# Patient Record
Sex: MALE | Race: WHITE | NOT HISPANIC OR LATINO | URBAN - METROPOLITAN AREA
[De-identification: names, ages, dates, MRNs, and addresses within clinical notes are randomized per-mention and may not be internally consistent; named-entity substitution may affect disease eponyms.]

---

## 2019-04-23 ENCOUNTER — OFFICE VISIT - HEALTHEAST (OUTPATIENT)
Dept: FAMILY MEDICINE | Facility: CLINIC | Age: 24
End: 2019-04-23

## 2019-04-23 DIAGNOSIS — Z00.00 ANNUAL PHYSICAL EXAM: ICD-10-CM

## 2019-04-23 DIAGNOSIS — R03.0 ELEVATED BLOOD PRESSURE READING WITHOUT DIAGNOSIS OF HYPERTENSION: ICD-10-CM

## 2019-04-23 DIAGNOSIS — E66.9 OBESITY (BMI 30-39.9): ICD-10-CM

## 2019-04-23 DIAGNOSIS — G47.33 OSA (OBSTRUCTIVE SLEEP APNEA): ICD-10-CM

## 2019-04-23 DIAGNOSIS — R10.84 ABDOMINAL PAIN, GENERALIZED: ICD-10-CM

## 2019-04-23 LAB
ALBUMIN SERPL-MCNC: 4.3 G/DL (ref 3.5–5)
ALP SERPL-CCNC: 71 U/L (ref 45–120)
ALT SERPL W P-5'-P-CCNC: 42 U/L (ref 0–45)
ANION GAP SERPL CALCULATED.3IONS-SCNC: 9 MMOL/L (ref 5–18)
AST SERPL W P-5'-P-CCNC: 17 U/L (ref 0–40)
BILIRUB SERPL-MCNC: 0.4 MG/DL (ref 0–1)
BUN SERPL-MCNC: 16 MG/DL (ref 8–22)
CALCIUM SERPL-MCNC: 10 MG/DL (ref 8.5–10.5)
CHLORIDE BLD-SCNC: 106 MMOL/L (ref 98–107)
CHOLEST SERPL-MCNC: 166 MG/DL
CO2 SERPL-SCNC: 25 MMOL/L (ref 22–31)
CREAT SERPL-MCNC: 0.78 MG/DL (ref 0.7–1.3)
FASTING STATUS PATIENT QL REPORTED: YES
GFR SERPL CREATININE-BSD FRML MDRD: >60 ML/MIN/1.73M2
GLUCOSE BLD-MCNC: 87 MG/DL (ref 70–125)
HBA1C MFR BLD: 5 % (ref 3.5–6)
HDLC SERPL-MCNC: 37 MG/DL
LDLC SERPL CALC-MCNC: 82 MG/DL
POTASSIUM BLD-SCNC: 4.2 MMOL/L (ref 3.5–5)
PROT SERPL-MCNC: 7.1 G/DL (ref 6–8)
SODIUM SERPL-SCNC: 140 MMOL/L (ref 136–145)
TRIGL SERPL-MCNC: 234 MG/DL

## 2019-04-23 ASSESSMENT — MIFFLIN-ST. JEOR: SCORE: 2169.67

## 2019-04-24 ENCOUNTER — COMMUNICATION - HEALTHEAST (OUTPATIENT)
Dept: FAMILY MEDICINE | Facility: CLINIC | Age: 24
End: 2019-04-24

## 2019-04-24 LAB — MILK IGE QN: <0.35 KU/L

## 2019-04-25 ENCOUNTER — COMMUNICATION - HEALTHEAST (OUTPATIENT)
Dept: FAMILY MEDICINE | Facility: CLINIC | Age: 24
End: 2019-04-25

## 2019-04-25 LAB
GLIADIN IGA SER-ACNC: 1.4 U/ML
GLIADIN IGG SER-ACNC: <0.4 U/ML
IGA SERPL-MCNC: 209 MG/DL (ref 65–400)
TTG IGA SER-ACNC: 0.3 U/ML
TTG IGG SER-ACNC: <0.6 U/ML

## 2019-07-10 ENCOUNTER — OFFICE VISIT - HEALTHEAST (OUTPATIENT)
Dept: SLEEP MEDICINE | Facility: CLINIC | Age: 24
End: 2019-07-10

## 2019-07-10 ENCOUNTER — AMBULATORY - HEALTHEAST (OUTPATIENT)
Dept: SLEEP MEDICINE | Facility: CLINIC | Age: 24
End: 2019-07-10

## 2019-07-10 DIAGNOSIS — R29.818 SUSPECTED SLEEP APNEA: ICD-10-CM

## 2019-07-10 DIAGNOSIS — G47.10 HYPERSOMNIA: ICD-10-CM

## 2019-07-10 DIAGNOSIS — R06.83 SNORING: ICD-10-CM

## 2019-07-10 ASSESSMENT — MIFFLIN-ST. JEOR: SCORE: 2142.45

## 2019-07-12 ENCOUNTER — OFFICE VISIT - HEALTHEAST (OUTPATIENT)
Dept: FAMILY MEDICINE | Facility: CLINIC | Age: 24
End: 2019-07-12

## 2019-07-12 DIAGNOSIS — K52.9 CHRONIC DIARRHEA: ICD-10-CM

## 2019-07-12 DIAGNOSIS — G43.A0 CYCLIC VOMITING SYNDROME, INTRACTABILITY OF VOMITING NOT SPECIFIED, PRESENCE OF NAUSEA NOT SPECIFIED: ICD-10-CM

## 2019-07-12 ASSESSMENT — MIFFLIN-ST. JEOR: SCORE: 2140.64

## 2019-07-17 ENCOUNTER — COMMUNICATION - HEALTHEAST (OUTPATIENT)
Dept: SLEEP MEDICINE | Facility: CLINIC | Age: 24
End: 2019-07-17

## 2019-07-17 DIAGNOSIS — G47.33 OBSTRUCTIVE SLEEP APNEA: ICD-10-CM

## 2019-08-12 ENCOUNTER — RECORDS - HEALTHEAST (OUTPATIENT)
Dept: ADMINISTRATIVE | Facility: OTHER | Age: 24
End: 2019-08-12

## 2019-08-14 ENCOUNTER — COMMUNICATION - HEALTHEAST (OUTPATIENT)
Dept: SLEEP MEDICINE | Facility: CLINIC | Age: 24
End: 2019-08-14

## 2019-08-14 ENCOUNTER — RECORDS - HEALTHEAST (OUTPATIENT)
Dept: ADMINISTRATIVE | Facility: OTHER | Age: 24
End: 2019-08-14

## 2019-08-26 ENCOUNTER — AMBULATORY - HEALTHEAST (OUTPATIENT)
Dept: SLEEP MEDICINE | Facility: CLINIC | Age: 24
End: 2019-08-26

## 2019-08-27 ENCOUNTER — COMMUNICATION - HEALTHEAST (OUTPATIENT)
Dept: SLEEP MEDICINE | Facility: CLINIC | Age: 24
End: 2019-08-27

## 2019-10-12 ENCOUNTER — COMMUNICATION - HEALTHEAST (OUTPATIENT)
Dept: SLEEP MEDICINE | Facility: CLINIC | Age: 24
End: 2019-10-12

## 2019-12-22 ENCOUNTER — COMMUNICATION - HEALTHEAST (OUTPATIENT)
Dept: SLEEP MEDICINE | Facility: CLINIC | Age: 24
End: 2019-12-22

## 2019-12-27 ENCOUNTER — COMMUNICATION - HEALTHEAST (OUTPATIENT)
Dept: SLEEP MEDICINE | Facility: CLINIC | Age: 24
End: 2019-12-27

## 2021-02-01 ENCOUNTER — COMMUNICATION - HEALTHEAST (OUTPATIENT)
Dept: FAMILY MEDICINE | Facility: CLINIC | Age: 26
End: 2021-02-01

## 2021-02-01 ENCOUNTER — COMMUNICATION - HEALTHEAST (OUTPATIENT)
Dept: SLEEP MEDICINE | Facility: CLINIC | Age: 26
End: 2021-02-01

## 2021-05-28 NOTE — TELEPHONE ENCOUNTER
----- Message from Case Duran MD sent at 4/25/2019  7:35 AM CDT -----  Is inform patient that the milk allergy testing was negative.  Mail letter please  Case Duran MD  4/25/2019

## 2021-05-28 NOTE — TELEPHONE ENCOUNTER
Left message to call back for: REsults- letter sent  Information to relay to patient:  MD's message below- letter sent

## 2021-05-28 NOTE — PROGRESS NOTES
Assessment:     1. Annual physical exam  Comprehensive Metabolic Panel    Lipid Cascade    Celiac(Gluten)Antibody Panel    Milk IgE   2. Abdominal pain, generalized     3. SUNG (obstructive sleep apnea)  Ambulatory referral to Sleep Medicine   4. Obesity (BMI 30-39.9)  Glycosylated Hemoglobin A1c     Continue to monitor abdominal pain.  Discussed elimination diet.  He does admit that when he takes ice cream smoothies, he pretty much has to run to the bathroom right away.  I believe this is milk intolerance versus lactose intolerance.  He is able to tolerate a bowl of cereal with milk without too much of side effect.  He is also thinking perhaps an allergy and will go ahead and check the labs.  Did discuss that antibodies may not correlate with clinical symptoms if the system was not challenged.  He agrees.  Noted elevated blood pressure that somewhat came down during the second recheck.  Encouraged that he check it outside of clinic and come back in 3-6 months time.    We discussed his weight.  Discussed dietary diary and weight loss with lifestyle modification.  He is agreeable.  Discussed that with the weight loss, his blood pressure may come in the normal range.    Screening labs as above.     Plan:       All questions answered.  Diagnosis explained in detail, including differential.  Dietary diary.  Discussed healthy lifestyle modifications.  Educational material distributed.  Follow up: Return in about 3 months (around 7/23/2019) for Recheck.     Subjective:     Chief Complaint   Patient presents with     Annual Exam     Sleep apnea- very loud while sleeping- has had sleep apnea test before about 2-3 years ago,      Abdominal Pain     Seemed to always be an issues his entire life- used to throw up bio in the morning, relating this maybe to gluten? has seen other doctors about this        Edward BETHANY Prado is a 23 y.o. male who presents for an annual exam. The patient reports that there is not domestic violence in  his life.     1- SUNG: Patient informs of snoring issues and had a sleep study done in [place] about 2 years ago.  He was told that he is in between the need for a CPAP and he opted for doing lifestyle modifications.  Now, he feels that he should start getting a CPAP machine as his symptoms have persisted.    I reviewed his past sleep study done and in [place] that is available in care everywhere.  It was recommended that he use a CPAP or use a dental appliance.  I will refer him to a sleep medicine to further evaluate into this.    2- Abdominal pain: In epigastric pain, bilious vomiting in morning for the last few months. This has stopped after starting Nexium. Now has started having Diarrhea, now stools are 5-6 x a day. Feels like high load of milk. Possible Gluten allergy.         PERTINENT FINDINGS ON PHYSICAL EXAMINATION: BMI 28, neck circumference 17.25 inches. Oropharynx reveals a class IV Mallampati, 3+ tonsillar tissue.     STUDY RESULTS: Patient was studied via overnight polysomnography. I personally reviewed the raw data in detail. Following lights out, sleep onset occurred after 16 minutes. Total sleep time (TST) was 347 minutes corresponding to a sleep efficiency of 78%. REM latency was prolonged at 177 minutes. Sleep architecture revealed normal cycling between non-REM and REM sleep with 3 REM periods. Stage 1 sleep at 8.5% of TST, stage 2 sleep occupied 66.3% of TST, stage 3 sleep 12.1% of TST, and REM sleep occupied 13.1% of TST. Periodic limb movements were not excessively noted. EEG morphology was normal.     RESPIRATORY MEASUREMENTS: Mean SpO2 97%. SpO2 rehana 89%. According to standard scoring criteria, patient had 58 hypopneas 2 obstructive apneas corresponding to an index (AHI) 15 per hour. AHI in supine sleep was 57 per hour.     CLINICAL OBSERVATION: Moderate intermittent snoring was described. Preferred mask during demo was a large Wisp.     ELECTROCARDIOGRAPHIC OBSERVATION: Sinus tachycardia.  No other arrhythmias. Mean heart rate 77 bpm, max heart rate 101 bpm.     CONCLUSIONS:  1. Obstructive sleep apnea diagnosed by stress (AHI 15/hour, SpO2 rehana 89%).   2. History of insomnia and ADHD.     RECOMMENDATIONS:  1. Recommend treatment of sleep apnea via CPAP or dental appliance.  2. If the patient would like to start CPAP, he may consider CPAP titration study to determine response to therapy and optimal pressure or auto-titrating CPAP (5 to 10 cm H2O).   3. Patient to follow up with Sleep Medicine Service.     Jackie Beauchamp MD  [facility]  Sleep Medicine    cc:    D: 04/28/2016 13:08:59/KAB Job ID: [id number]      Healthy Habits:   Regular Exercise: Yes and No  Sunscreen Use: Yes  Healthy Diet: Yes and No  Dental Visits Regularly: Yes  Seat Belt: Yes  Sexually active: Yes  Monthly Self Testicular Exams:  Yes  Hemoccults: No  Flex Sig: No  Colonoscopy: No  Lipid Profile: No  Glucose Screen: No  Prevention of Osteoporosis: N/A  Last Dexa: N/A  Guns at Home:  Yes  Guns Safety Locks:  Yes      Immunization History   Administered Date(s) Administered     DTP / HiB 1993, 02/15/1994, 04/19/1994, 04/26/1995     Dtap 04/26/1995, 02/17/1999     HPV 9 Valent 10/31/2011, 01/06/2012, 05/21/2012     HPV Quadrivalent 10/31/2011, 01/06/2012, 05/21/2012     Hep A, Adult IM (19yr & older) 07/18/2012     Hep A, historic 07/18/2012     Hep B, historic 1993, 1993, 04/19/1994     History of Varicella/chicken Pox 11/16/1999     Influenza [id number], 12/28/2009     Influenza, Seasonal, Inj PF IIV3 12/28/2009     Influenza,seasonal, Inj IIV3 12/28/2009, 11/11/2010, 10/06/2011, 10/05/2013     MMR 01/17/1995, 02/17/1999     Meningococcal MCV4O 07/20/2005, 04/30/2012     Meningococcal MCV4P 07/20/2005, 04/30/2012     OPV,Trivalent,Historic(5546-6379 only) 1993, 02/15/1994, 04/26/1995, 02/17/1999     PPD Test 07/12/1994     Tdap 07/05/2006, 12/09/2016     Varicella 12/20/1995      Immunization status: up to date and documented.    No exam data present    No current outpatient medications on file.     No current facility-administered medications for this visit.      History reviewed. No pertinent past medical history.  History reviewed. No pertinent surgical history.  Amoxicillin-pot clavulanate  History reviewed. No pertinent family history.  Social History     Socioeconomic History     Marital status: Single     Spouse name: Not on file     Number of children: Not on file     Years of education: Not on file     Highest education level: Not on file   Occupational History     Not on file   Social Needs     Financial resource strain: Not on file     Food insecurity:     Worry: Not on file     Inability: Not on file     Transportation needs:     Medical: Not on file     Non-medical: Not on file   Tobacco Use     Smoking status: Never Smoker     Smokeless tobacco: Never Used   Substance and Sexual Activity     Alcohol use: Not on file     Drug use: Not on file     Sexual activity: Not on file   Lifestyle     Physical activity:     Days per week: Not on file     Minutes per session: Not on file     Stress: Not on file   Relationships     Social connections:     Talks on phone: Not on file     Gets together: Not on file     Attends Bahai service: Not on file     Active member of club or organization: Not on file     Attends meetings of clubs or organizations: Not on file     Relationship status: Not on file     Intimate partner violence:     Fear of current or ex partner: Not on file     Emotionally abused: Not on file     Physically abused: Not on file     Forced sexual activity: Not on file   Other Topics Concern     Not on file   Social History Narrative     Not on file       Review of Systems  General:  Denies problem  Eyes: Denies problem  Ears/Nose/Throat: Denies problem  Cardiovascular: Denies problem  Respiratory:  Denies problem  Gastrointestinal:  As in HPI  Genitourinary: Denies  "problem  Musculoskeletal:  Denies problem  Skin: Denies problem  Neurologic: Denies problem  Psychiatric: Denies problem  Endocrine: Denies problem  Heme/Lymphatic: Denies problem   Allergic/Immunologic: Denies problem        Objective:     Vitals:    04/23/19 0732   BP: (!) 147/93   Pulse: 99   SpO2: 95%   Weight: (!) 248 lb (112.5 kg)   Height: 6' 2\" (1.88 m)     Body mass index is 31.84 kg/m .    Physical  General Appearance: Alert, cooperative, no distress, appears stated age  Head: Normocephalic, without obvious abnormality, atraumatic  Eyes: PERRL, conjunctiva/corneas clear, EOM's intact  Ears: Normal TM's and external ear canals, both ears  Nose: Nares normal, septum midline,mucosa normal, no drainage  Throat: Lips, mucosa, and tongue normal; teeth and gums normal  Neck: Supple, symmetrical, trachea midline, no adenopathy;  thyroid: not enlarged, symmetric, no tenderness/mass/nodules; no carotid bruit or JVD  Back: Symmetric, no curvature, ROM normal, no CVA tenderness  Lungs: Clear to auscultation bilaterally, respirations unlabored  Heart: Regular rate and rhythm, S1 and S2 normal, no murmur, rub, or gallop,  Abdomen: Soft, non-tender, bowel sounds active all four quadrants,  no masses, no organomegaly  Musculoskeletal: Normal range of motion. No joint swelling or deformity.   Extremities: Extremities normal, atraumatic, no cyanosis or edema  Skin: Skin color, texture, turgor normal, no rashes or lesions  Lymph nodes: Cervical, supraclavicular, and axillary nodes normal  Neurologic: He is alert. He has normal reflexes.   Psychiatric: He has a normal mood and affect.            "

## 2021-05-28 NOTE — TELEPHONE ENCOUNTER
Left message to call back for: Test results  Information to relay to patient:  LM for Pt letting him know Dr Duran's message.  CHRISTIAN CamachoA

## 2021-05-28 NOTE — TELEPHONE ENCOUNTER
Left message to call back for: Results  Information to relay to patient:  LMTCB. Please relay message below from Dr. Duran to patient. Result letter mailed to patient.

## 2021-05-28 NOTE — TELEPHONE ENCOUNTER
Patient Returning Call  Reason for call:  Retuning phone call  Information relayed to patient:  Below message relayed to patient.  Patient has additional questions:  No  If YES, what are your questions/concerns:  No additional questions at this time.  Okay to leave a detailed message?: No call back needed

## 2021-05-28 NOTE — TELEPHONE ENCOUNTER
----- Message from Case Duran MD sent at 4/25/2019  3:17 PM CDT -----  Please inform patient that the testing for gluten is negative.  Case Duran MD  4/25/2019

## 2021-05-28 NOTE — TELEPHONE ENCOUNTER
----- Message from Case uDran MD sent at 4/24/2019  9:43 AM CDT -----  Please inform patient the lab available so far are normal.  His triglycerides are elevated, however he was not fasting.  These numbers can be rechecked when he is truly fasting.  Labs for allergy testing is still pending.  Case Duran MD  4/24/2019

## 2021-05-30 NOTE — PATIENT INSTRUCTIONS - HE
Equipment Instructions    We will process your PAP order and send it to a Durable Medical Equipment (DME) provider.    The medical equipment company should call you within 7 days.  If you have not heard from the company, please contact them to see if they received your order and are planning to call you.    Please call us at [phone] if you are unable to contact the medical equipment company or if they do not have the order.    If you are starting a new PAP machine, please call us after you use it the first night to let us know how it went. This call also helps us know that you received your equipment and that everything is ready. Please use our central phone number [phone]    Contact information for [employer] company:    -[employer] Tel: [phone]    Please bring your machine, mask, hose and power cord on the next clinical visit with me. Thank you.

## 2021-05-30 NOTE — TELEPHONE ENCOUNTER
Called the patient, informed him of below. Patient expressed understanding. Prescription sent.    Order for Durable Medical Equipment was processed and equipment ordered.     DME Company: [employer]    Date: 7/17/2019    Ordering Provider: Dr. Ulloa    Equipment Ordered: CPAP    Fax Number: [facility] [employer] [facility] (in house)    Trisha Montalvo CMA 7/17/2019 2:50 PM

## 2021-05-30 NOTE — PROGRESS NOTES
Dear Case Key Md  [address] [place] [address]    Thank you for the opportunity to participate in the care of Mr. Edward Prado.    He is a 23 y.o. male who comes to the clinic for the transfer of care of his obstructive sleep apnea.  The patient states that he was diagnosed with obstructive sleep apnea from [facility] in [place] approximately 2 years ago.  He was strongly advised to lose weight as the sole method to treat his sleep apnea.  After recently recording himself sleeping he found that he still suffer from pauses in his breathing followed by loud snoring.  He also continues to wake up feeling tired with drowsy driving.  The patient's review of systems is also significant for history of GERD.    The patient's family history did not reveal any sleep disordered breathing.     Ideal Sleep-Wake Cycle(devoid of societal pressure):    Patient would try to initiate sleep at around 2 AM with a sleep latency of 20 minutes. The patient would have 15 awakenings. Final wake up time is around 9 AM.    Past Medical History  No past medical history on file.     Past Surgical History  Past Surgical History:   Procedure Laterality Date     RADIAL HEAD ARTHROPLASTY  2009        [facility]  No current outpatient medications on file.     No current facility-administered medications for this visit.         Allergies  Amoxicillin-pot clavulanate     Social History  Social History     Socioeconomic History     Marital status: Single     Spouse name: Not on file     Number of children: Not on file     Years of education: Not on file     Highest education level: Not on file   Occupational History     Not on file   Social Needs     Financial resource strain: Not on file     Food insecurity:     Worry: Not on file     Inability: Not on file     Transportation needs:     Medical: Not on file     Non-medical: Not on file   Tobacco Use     Smoking status: Never Smoker     Smokeless tobacco: Never Used    Substance and Sexual Activity     Alcohol use: Yes     Frequency: 2-4 times a month     Drinks per session: 1 or 2     Binge frequency: Never     Drug use: Yes     Types: Marijuana     Comment: couple of times a week.     Sexual activity: Not on file   Lifestyle     Physical activity:     Days per week: Not on file     Minutes per session: Not on file     Stress: Not on file   Relationships     Social connections:     Talks on phone: Not on file     Gets together: Not on file     Attends Yarsanism service: Not on file     Active member of club or organization: Not on file     Attends meetings of clubs or organizations: Not on file     Relationship status: Not on file     Intimate partner violence:     Fear of current or ex partner: Not on file     Emotionally abused: Not on file     Physically abused: Not on file     Forced sexual activity: Not on file   Other Topics Concern     Not on file   Social History Narrative    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 No children        Case Duran MD  4/23/2019            Family History  Family History   Problem Relation Age of Onset     Thyroid cancer Mother      Pneumonia Paternal Grandmother      Osteoporosis Paternal Grandmother      Diabetes type II Paternal Grandfather         Review of Systems:  Constitutional: Negative except as noted in HPI.   Eyes: Negative except as noted in HPI.   ENT: Negative except as noted in HPI.   Cardiovascular: Negative except as noted in HPI.   Respiratory: Negative except as noted in HPI.   Gastrointestinal: Negative except as noted in HPI.   Genitourinary: Negative except as noted in HPI.   Musculoskeletal: Negative except as noted in HPI.   Integumentary: Negative except as noted in HPI.   Neurological: Negative except as noted in HPI.   Psychiatric: Negative except as noted in HPI.   Endocrine: Negative except as noted in HPI.   Hematologic/Lymphatic: Negative except as noted in HPI.   "    STOP BANG 7/10/2019   Do you snore loudly (louder than talking or loud enough to be heard through closed doors)? 1   Do you often feel tired, fatigued, or sleepy during daytime? 1   Has anyone observed you stop breathing in your sleep? 1   Do you have or are you being treated for high blood pressure? 0   BMI more than 35 kg/m2 0   Age over 50 years old? 1   Neck circumference greater than 16 inches? 1     Epworths Sleepiness Scale 7/10/2019   Sitting and reading 3   Watching TV 3   Sitting, inactive in a public place (e.g. a theatre or a meeting) 3   As a passenger in a car for an hour without a break 3   Lying down to rest in the afternoon when circumstances permit 3   Sitting and talking to someone 3   Sitting quietly after a lunch without alcohol 3   In a car, while stopped for a few minutes in traffic 1   Total score 22     Rooming 7/10/2019   Usual bedtime midnight   Sleep Latency 20 minutes   Awakenings 15 times   Wake Up Time 9am   Weekends varies   Energy Drinks never   Coffee 1 cup every 2 weeks or so   Cola none   Difficulty falling asleep No   Difficulty staying asleep Yes   Excessive daytime tiredness Yes   Excessive daytime sleepiness Yes   Dozing off while driving Yes   Shift Worker No   Sleep Walking? No   Sleep Talking? Yes   Kicking or punching? No   Restless legs symptoms No       Physical Exam:  /82   Pulse 96   Ht 6' 2\" (1.88 m)   Wt (!) 242 lb (109.8 kg)   SpO2 97%   BMI 31.07 kg/m    BMI:Body mass index is 31.07 kg/m .   GEN: NAD, obese  Head: Normocephalic.  EYES: PERRLA, EOMI  ENT: Oropharynx is clear, Wilhelm class 4+ airway.   Nasal mucosa is moist without erythema  Neck : Thyroid is within normal limits. Neck Circ 19.25\"  CV: Regular rate and rhythm, S1 & S2 positive.  LUNGS: Bilateral breathsounds heard.   ABDOMEN: Positive bowel sounds in all quadrants, soft, no rebound or guarding  MUSCULOSKELETAL: Bilateral trace leg swelling  SKIN: warm, dry, no rashes  Neurological: " Alert, oriented to time, place, and person.  Psych: normal mood, normal affect     Labs/Studies:     No results found for: WBC, HGB, HCT, MCV, PLT      Chemistry        Component Value Date/Time     04/23/2019 0812    K 4.2 04/23/2019 0812     04/23/2019 0812    CO2 25 04/23/2019 0812    BUN 16 04/23/2019 0812    CREATININE 0.78 04/23/2019 0812    GLU 87 04/23/2019 0812        Component Value Date/Time    CALCIUM 10.0 04/23/2019 0812    ALKPHOS 71 04/23/2019 0812    AST 17 04/23/2019 0812    ALT 42 04/23/2019 0812    BILITOT 0.4 04/23/2019 0812            No results found for: FERRITIN  No results found for: TSH      Assessment and Plan:  In summary Edward Prado is a 23 y.o. year old male here for sleep disturbance.  1.  Suspected sleep apnea  The patient would like to establish care so he can get a CPAP machine from [employer] equipment company.  I informed the patient that I will need to try to obtain the original sleep study from [facility].  If the results show that he does have obstructive sleep apnea I be happy to write him a prescription so he can get a CPAP machine.  If we are unable to obtain the original sleep study, I may need to order another sleep study at that time.  The patient expressed understanding and agreed.  2.  Hypersomnia  3.  Snoring    Patient verbalized understanding of these issues, agrees with the plan and all questions were answered today. Patient was given an opportuntity to voice any other symptoms or concerns not listed above. Patient did not have any other symptoms or concerns.         Claudio Ulloa DO  Board Certified in Internal Medicine and Sleep Medicine  [facility].    (Note created with Dragon voice recognition and unintended spelling errors and word substitutions may occur)

## 2021-05-30 NOTE — PROGRESS NOTES
Sleep study records received today: 7/17/2019    From: [facility]    They have been sent to scan into patients chart, and a copy has been given to: Dr. Ulloa for review.

## 2021-05-30 NOTE — TELEPHONE ENCOUNTER
We were able to obtain the patient's sleep study from .  The sleep study was performed on 04/25/2016.  The patient's apnea hypopnea index was 15 events per hour.    I will therefore write a prescription for the patient to receive CPAP machine to [facility] as per his request.    After he has obtained his CPAP machine strongly recommend that he schedule follow-up visit to see me to bring his machine, mask, hose with about the next clinical visit.    Please call the patient to inform him of this change.  Thank you.

## 2021-05-30 NOTE — PROGRESS NOTES
Assessment:     1. Chronic diarrhea  Ambulatory referral to Gastroenterology   2. Cyclic vomiting syndrome, intractability of vomiting not specified, presence of nausea not specified  ondansetron (ZOFRAN-ODT) 8 MG disintegrating tablet    Ambulatory referral to Gastroenterology     Reviewed emergency report.  He was given IV hydration and treated with antiemetics Zofran with relief  Labs done included CBC, UA, lipase and a complete metabolic panel which were essentially normal.  And imaging was not pursued in the emergency room.    At this time diarrhea is stable and patient would like to see a specialist because of ongoing issues.  Offered imaging but patient would like to see GI before pursuing.    Patient had forms for work for FMLA/work adjustment where he should be able to work from home for 1 to 2 days/week as he is needing to use the bathroom very frequently.  These forms were filled out for him pending GI evaluation.     Diarrhea of uncertain etiology, moderate in severity    Warning sign and symptoms discussed with the patient and patient to seek immediate help if  present    Plan:      Appropriate educational material discussed and distributed.  Discussed the appropriate management of diarrhea.  GI consult.     Subjective:      Chief Complaint   Patient presents with     GI Problem     Really bad in the mornings, had to miss some work due to stomach issues, diarrhea, almost always happens in the morning        Edward Prado is a 23 y.o. male who presents for evaluation of diarrhea. Onset of diarrhea was 3 months ago. Diarrhea is occurring approximately 3-4  times per day.   Mostly in the morning and has to rush.  Sometimes, has to rush to the bathroom  Cramping in sleep and has to run to bathroom  Patient describes diarrhea as mucous-laden, semisolid and watery. Diarrhea has been associated with abdominal pain described as aching and intense pressure. Patient denies fever, unintentional weight loss.  Previous visits for diarrhea: yes, last seen a few weeks ago by ER. Evaluation to date: CBC and electrolytes, BUN and creatinine.  Treatment to date: diet modification.    The following portions of the patient's history were reviewed and updated as appropriate: allergies, current medications, past family history, past medical history, past social history, past surgical history and problem list.  Allergies   Allergen Reactions     Amoxicillin-Pot Clavulanate Rash       Current Outpatient Medications on File Prior to Visit   Medication Sig Dispense Refill     multivitamin therapeutic tablet Take 1 tablet by mouth daily.       ondansetron (ZOFRAN-ODT) 8 MG disintegrating tablet TK 1 T PO Q 8 H PRN       No current facility-administered medications on file prior to visit.        Patient Active Problem List   Diagnosis     Attention deficit hyperactivity disorder (ADHD), predominantly inattentive type     Epidermal nevus syndrome     Elevated blood pressure reading without diagnosis of hypertension     Obesity (BMI 30-39.9)     SUNG (obstructive sleep apnea)       History reviewed. No pertinent past medical history.    Past Surgical History:   Procedure Laterality Date     RADIAL HEAD ARTHROPLASTY  2009       Family History   Problem Relation Age of Onset     Thyroid cancer Mother      Pneumonia Paternal Grandmother      Osteoporosis Paternal Grandmother      Diabetes type II Paternal Grandfather        Social History     Socioeconomic History     Marital status: Single     Spouse name: None     Number of children: None     Years of education: None     Highest education level: None   Occupational History     None   Social Needs     Financial resource strain: None     Food insecurity:     Worry: None     Inability: None     Transportation needs:     Medical: None     Non-medical: None   Tobacco Use     Smoking status: Never Smoker     Smokeless tobacco: Never Used   Substance and Sexual Activity     Alcohol use: Yes      "Frequency: 2-4 times a month     Drinks per session: 1 or 2     Binge frequency: Never     Drug use: Yes     Types: Marijuana     Comment: couple of times a week.     Sexual activity: None   Lifestyle     Physical activity:     Days per week: None     Minutes per session: None     Stress: None   Relationships     Social connections:     Talks on phone: None     Gets together: None     Attends Synagogue service: None     Active member of club or organization: None     Attends meetings of clubs or organizations: None     Relationship status: None     Intimate partner violence:     Fear of current or ex partner: None     Emotionally abused: None     Physically abused: None     Forced sexual activity: None   Other Topics Concern     None   Social History Narrative    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 No children        Case Duran MD  4/23/2019           Review of Systems  Constitutional: negative  Respiratory: negative  Cardiovascular: negative  Hematologic/lymphatic: negative  Musculoskeletal:negative      Objective:      BP (!) 119/92 (Patient Site: Left Arm, Patient Position: Sitting, Cuff Size: Adult Large)   Pulse 79   Temp 96.9  F (36.1  C)   Ht 6' 2\" (1.88 m)   Wt (!) 241 lb 9.6 oz (109.6 kg)   SpO2 97%   BMI 31.02 kg/m      GENERAL APPEARANCE:  Appearing stated age, smiling, alert, cooperative, and in no acute distress.   HEENT: Pupils equal, regular, react to light and accommodation. Extraocular muscles intact, fundi benign. Ear canals and tympanic membranes are normal. Lips, mouth, and throat are unremarkable.   NECK: Neck supple without adenopathy, thyromegaly or masses.   LYMPH: No anterior cervical or supraclavicular LN enlargement   PULMONARY: Normal respiratory effort. Chest is clear.   CARDIOVASCULAR: Heart auscultation: rhythm regular, heart sounds normal S1 and S2, bruit carotid artery absent.   SKIN: Warm and well perfused..   ABDOMEN: Abdomen " soft, non-tender. BS normal. No masses or organomegaly.   EXTREMITIES: Peripheral pulses are full. Extremities with no edema.   MENTAL STATUS: Alert, oriented and thought content appropriate   NEUROLOGIC: Station and gait normal, strength and movement normal, reflexes are normal and symmetric

## 2021-05-30 NOTE — PROGRESS NOTES
MONTRELL Faxed to: [facility]    Requesting sleep study records to include graphs and interpretations.        F): [phone]  P):     Confirmation received that fax went through successfully.

## 2021-05-31 NOTE — PROGRESS NOTES
Patient was offered choice of vendor and chose [facility].  Patient Edward Prado was set up at [facility] on 8/26/19. Patient received a Resmed [id number] Auto. Pressures were set at 5-15.   Patient s ramp is 5 cm H2O for off and FLEX/EPR is EPR 2.  Patient received a Resmed Mask name: Airfit F30  FFM Size med, heated tubing and heated humidifier.    Pacee Her

## 2021-06-03 VITALS — BODY MASS INDEX: 31.01 KG/M2 | HEIGHT: 74 IN | WEIGHT: 241.6 LBS

## 2021-06-03 VITALS — WEIGHT: 248 LBS | BODY MASS INDEX: 31.83 KG/M2 | HEIGHT: 74 IN

## 2021-06-03 VITALS — WEIGHT: 242 LBS | BODY MASS INDEX: 31.06 KG/M2 | HEIGHT: 74 IN

## 2021-06-17 NOTE — PATIENT INSTRUCTIONS - HE
Patient Instructions by Case Duran MD at 7/12/2019  8:10 AM     Author: Case Duran MD Service: -- Author Type: Physician    Filed: 7/12/2019  8:42 AM Encounter Date: 7/12/2019 Status: Signed    : Case Duran MD (Physician)       Patient Education     Diarrhea with Uncertain Cause (Adult)    Diarrhea is when stools are loose and watery. This can be caused by:    Viral infections    Bacterial infections    Food poisoning    Parasites    Irritable bowel syndrome (IBS)    Inflammatory bowel diseases such as ulcerative colitis, Crohn's disease, and celiac disease    Food intolerance, such as to lactose, the sugar found in milk and milk products    Reaction to medicines like antibiotics, laxatives, cancer drugs, and antacids  Along with diarrhea, you may also have:    Abdominal pain and cramping    Nausea and vomiting    Loss of bowel control    Fever and chills    Bloody stools  In some cases, antibiotics may help to treat diarrhea. You may have a stool sample test. This is done to see what is causing your diarrhea, and if antibiotics will help treat it. The results of a stool sample test may take up to 2 days. The healthcare provider may not give you antibiotics until he or she has the stool test results.  Diarrhea can cause dehydration. This is the loss of too much water and other fluids from the body. When this occurs, body fluid must be replaced. This can be done with oral rehydration solutions. Oral rehydration solutions are available at drugstores and grocery stores without a prescription.  Home care  Follow all instructions given by your healthcare provider. Rest at home for the next 24 hours, or until you feel better. Avoid caffeine, tobacco, and alcohol. These can make diarrhea, cramping, and pain worse.  If taking medicines:    Dont take over-the-counter diarrhea or nausea medicines unless your healthcare provider tells you to.    You may use acetaminophen or NSAID medicines like  ibuprofen or naproxen to reduce pain and fever. Dont use these if you have chronic liver or kidney disease, or ever had a stomach ulcer or gastrointestinal bleeding. Don't use NSAID medicines if you are already taking one for another condition (like arthritis) or are on daily aspirin therapy (such as for heart disease or after a stroke). Talk with your healthcare provider first.    If antibiotics were prescribed, be sure you take them until they are finished. Dont stop taking them even when you feel better. Antibiotics must be taken as a full course.  To prevent the spread of illness:    Remember that washing with soap and water and using alcohol-based  is the best way to prevent the spread of infection.    Clean the toilet after each use.    Wash your hands before eating.    Wash your hands before and after preparing food. Keep in mind that people with diarrhea or vomiting should not prepare food for others.    Wash your hands after using cutting boards, countertops, and knives that have been in contact with raw foods.    Wash and then peel fruits and vegetables.    Keep uncooked meats away from cooked and ready-to-eat foods.    Use a food thermometer when cooking. Cook poultry to at least 165 F (74 C). Cook ground meat (beef, veal, pork, lamb) to at least 160 F (71 C). Cook fresh beef, veal, lamb, and pork to at least 145 F (63 C).    Dont eat raw or undercooked eggs (poached or janis side up), poultry, meat, or unpasteurized milk and juices.  Food and drinks  The main goal while treating vomiting or diarrhea is to prevent dehydration. This is done by taking small amounts of liquids often.    Keep in mind that liquids are more important than food right now.    Drink only small amounts of liquids at a time.    Dont force yourself to eat, especially if you are having cramping, vomiting, or diarrhea. Dont eat large amounts at a time, even if you are hungry.    If you eat, avoid fatty, greasy, spicy, or fried  foods.    Dont eat dairy foods or drink milk if you have diarrhea. These can make diarrhea worse.  During the first 24 hours you can try:    Oral rehydration solutions. Do not use sports drinks. They have too much sugar and not enough electrolytes.    Soft drinks without caffeine    Ginger ale    Water (plain or flavored)    Decaf tea or coffee    Clear broth, consommé, or bouillon    Gelatin, popsicles, or frozen fruit juice bars  The second 24 hours, if you are feeling better, you can add:    Hot cereal, plain toast, bread, rolls, or crackers    Plain noodles, rice, mashed potatoes, chicken noodle soup, or rice soup    Unsweetened canned fruit (no pineapple)    Bananas  As you recover:    Limit fat intake to less than 15 grams per day. Dont eat margarine, butter, oils, mayonnaise, sauces, gravies, fried foods, peanut butter, meat, poultry, or fish.    Limit fiber. Dont eat raw or cooked vegetables, fresh fruits except bananas, or bran cereals.    Limit caffeine and chocolate.    Limit dairy.    Dont use spices or seasonings except salt.    Go back to your normal diet over time, as you feel better and your symptoms improve.    If the symptoms come back, go back to a simple diet or clear liquids.  Follow-up care  Follow up with your healthcare provider, or as advised. If a stool sample was taken or cultures were done, call the healthcare provider for the results as instructed.  Call 911  Call 911 if you have any of these symptoms:    Trouble breathing    Confusion    Extreme drowsiness or trouble walking    Loss of consciousness    Rapid heart rate    Chest pain    Stiff neck    Seizure  When to seek medical advice  Call your healthcare provider right away if any of these occur:    Abdominal pain that gets worse    Constant lower right abdominal pain    Continued vomiting and inability to keep liquids down    Diarrhea more than 5 times a day    Blood in vomit or stool    Dark urine or no urine for 8 hours, dry mouth  and tongue, tiredness, weakness, or dizziness    Drowsiness    New rash    You dont get better in 2 to 3 days    Fever of 100.4 F (38 C) or higher, or as directed by your healthcare provider  Date Last Reviewed: 1/3/2016    2447-4338 [employer]. [address] [place] [address]. All rights reserved. This information is not intended as a substitute for professional medical care. Always follow your healthcare professional's instructions.

## 2021-06-17 NOTE — PATIENT INSTRUCTIONS - HE
"Patient Instructions by Case Duran MD at 4/23/2019  7:30 AM     Author: Case Duran MD Service: -- Author Type: Physician    Filed: 4/23/2019  7:50 AM Encounter Date: 4/23/2019 Status: Addendum    : Case Duran MD (Physician)    Related Notes: Original Note by Case Duran MD (Physician) filed at 4/23/2019  7:42 AM       Keep a check of blood pressure outside the clinic 1-2 times per week and return for clinic visit in3 months time.  Sooner if most blood pressure readings are greater than 160/ 90mmHg   Also seek help immediately if you have  any symptoms referable to  elevated blood pressure, specifically chest pain, palpitations, shortness of breath or swelling in the legs, new onset headaches, new vision changes.              Patient Education     Understanding Body Mass Index (BMI)  Body mass index (BMI) is a method of screening for a weight category using the ratio of your height to your weight. The BMI is a measure of overweight that is corrected for height. Knowing your BMI is a way to tell if you are at a healthy weight. The higher your BMI, the greater your risk for weight-related health problems.  What BMI means    BMI below 18.5: Underweight    BMI 18.5 to 24.9: Healthy weight or \"ideal body weight\"     BMI 25 to 29.9: Overweight    BMI 30 and over: Obese    BMI 40 and over: Severe obesity   Online BMI Calculators  Find your BMI with an online BMI calculator tool, such as these from the CDC:    BMI calculator for adults    BMI calculator for children and teens   Using the BMI chart  To figure out your BMI, find your height and weight (or the numbers closest to them) on the table below. Follow each column of numbers to where your height and weight meet on the table. That is your BMI.    Date Last Reviewed: 7/1/2016 2000-2017 [employer]. [address] [place] [address], [place] [address]. All rights reserved. This information is not intended as a substitute for " professional medical care. Always follow your healthcare professional's instructions.

## 2021-06-19 NOTE — LETTER
Letter by Case Duran MD at      Author: Case Duran MD Service: -- Author Type: --    Filed:  Encounter Date: 4/25/2019 Status: (Other)         Edward Prado  [address] [address]             April 25, 2019         Dear Mr. Prado,    Below are the results from your recent visit:    Resulted Orders   Comprehensive Metabolic Panel   Result Value Ref Range    Sodium 140 136 - 145 mmol/L    Potassium 4.2 3.5 - 5.0 mmol/L    Chloride 106 98 - 107 mmol/L    CO2 25 22 - 31 mmol/L    Anion Gap, Calculation 9 5 - 18 mmol/L    Glucose 87 70 - 125 mg/dL    BUN 16 8 - 22 mg/dL    Creatinine 0.78 0.70 - 1.30 mg/dL    GFR MDRD Af Amer >60 >60 mL/min/1.73m2    GFR MDRD Non Af Amer >60 >60 mL/min/1.73m2    Bilirubin, Total 0.4 0.0 - 1.0 mg/dL    Calcium 10.0 8.5 - 10.5 mg/dL    Protein, Total 7.1 6.0 - 8.0 g/dL    Albumin 4.3 3.5 - 5.0 g/dL    Alkaline Phosphatase 71 45 - 120 U/L    AST 17 0 - 40 U/L    ALT 42 0 - 45 U/L    Narrative    Fasting Glucose reference range is 70-99 mg/dL per  American Diabetes Association (ADA) guidelines.   Lipid Cascade   Result Value Ref Range    Cholesterol 166 <=199 mg/dL    Triglycerides 234 (H) <=149 mg/dL    HDL Cholesterol 37 (L) >=40 mg/dL    LDL Calculated 82 <=129 mg/dL    Patient Fasting > 8hrs? Yes    Milk IgE   Result Value Ref Range    Milk, Processed, IgE <0.35 kU/L      Comment:      Class 0 (Negative <0.35)     Test Performed by:  [facility]  [address], [place] [address]   Glycosylated Hemoglobin A1c   Result Value Ref Range    Hemoglobin A1c 5.0 3.5 - 6.0 %       Your milk allergy testing was negative.    Please call with questions or contact us using [employer].    Sincerely,        Electronically signed by Case Duran MD